# Patient Record
Sex: FEMALE | Race: WHITE
[De-identification: names, ages, dates, MRNs, and addresses within clinical notes are randomized per-mention and may not be internally consistent; named-entity substitution may affect disease eponyms.]

---

## 2019-12-23 PROBLEM — Z00.00 ENCOUNTER FOR PREVENTIVE HEALTH EXAMINATION: Status: ACTIVE | Noted: 2019-12-23

## 2024-01-11 ENCOUNTER — APPOINTMENT (OUTPATIENT)
Dept: ORTHOPEDIC SURGERY | Facility: CLINIC | Age: 69
End: 2024-01-11
Payer: COMMERCIAL

## 2024-01-11 VITALS — BODY MASS INDEX: 23.56 KG/M2 | WEIGHT: 120 LBS | HEIGHT: 60 IN

## 2024-01-11 DIAGNOSIS — Z78.9 OTHER SPECIFIED HEALTH STATUS: ICD-10-CM

## 2024-01-11 DIAGNOSIS — M23.91 UNSPECIFIED INTERNAL DERANGEMENT OF RIGHT KNEE: ICD-10-CM

## 2024-01-11 PROCEDURE — 99203 OFFICE O/P NEW LOW 30 MIN: CPT

## 2024-01-11 NOTE — IMAGING
[de-identified] : RIGHT KNEE Mild effusion, no warmth, no ecchymosis, no erythema. Medial and posterior tenderness to palpation, no palpable defects. Range of motion 0-130 with pain 5/5 quadriceps and hamstring strength Negative Lachman, positive Jie, no varus or valgus instability. Motor and sensory intact distally Negative Zan Non-antalgic gait

## 2024-01-11 NOTE — ASSESSMENT
[FreeTextEntry1] : ACUTE TRAUMATIC KNEE PAIN S/P TWISTING MECHANISM WITH EPISODES OF INSTABILITY  FAILED CONSERVATIVE TREATMENT  XR REVIEWED FROM [facility], MILD PF OA  +JENNA, EFFUSION, AND MEDIAL/POSTERIOR JLT ON EXAM TODAY  WILL OBTAIN RIGHT KNEE MRI TO EVAL FOR MMT  ADVISED [facility] FOR WB SUPPORT. RX GIVEN.  RTC AFTER MRI TO REVIEW RESULTS

## 2024-01-11 NOTE — HISTORY OF PRESENT ILLNESS
[de-identified] : 1/11/2024: 68F here for right knee pain since summer 2023. On 12/25/23, she felt severe pain in the right knee after twisting mechanism. Pt attended PT recently which helped. No previous injuries or surgeries to the right knee. Has been undercare of PCP, got XR and tried naproxen/meloxicam without improvement. Reports increasing episodes of instability.

## 2024-01-17 ENCOUNTER — APPOINTMENT (OUTPATIENT)
Dept: MRI IMAGING | Facility: CLINIC | Age: 69
End: 2024-01-17
Payer: COMMERCIAL

## 2024-01-17 PROCEDURE — 73721 MRI JNT OF LWR EXTRE W/O DYE: CPT | Mod: RT

## 2024-01-24 ENCOUNTER — NON-APPOINTMENT (OUTPATIENT)
Age: 69
End: 2024-01-24

## 2024-01-25 ENCOUNTER — APPOINTMENT (OUTPATIENT)
Dept: ORTHOPEDIC SURGERY | Facility: CLINIC | Age: 69
End: 2024-01-25
Payer: COMMERCIAL

## 2024-01-25 VITALS — WEIGHT: 120 LBS | HEIGHT: 60 IN | BODY MASS INDEX: 23.56 KG/M2

## 2024-01-25 DIAGNOSIS — S83.231D COMPLEX TEAR OF MEDIAL MENISCUS, CURRENT INJURY, RIGHT KNEE, SUBSEQUENT ENCOUNTER: ICD-10-CM

## 2024-01-25 DIAGNOSIS — S83.511D SPRAIN OF ANTERIOR CRUCIATE LIGAMENT OF RIGHT KNEE, SUBSEQUENT ENCOUNTER: ICD-10-CM

## 2024-01-25 DIAGNOSIS — M17.11 UNILATERAL PRIMARY OSTEOARTHRITIS, RIGHT KNEE: ICD-10-CM

## 2024-01-25 PROCEDURE — 99214 OFFICE O/P EST MOD 30 MIN: CPT

## 2024-03-04 ENCOUNTER — RX RENEWAL (OUTPATIENT)
Age: 69
End: 2024-03-04

## 2024-03-04 RX ORDER — MELOXICAM 15 MG/1
15 TABLET ORAL DAILY
Qty: 30 | Refills: 1 | Status: ACTIVE | COMMUNITY
Start: 2024-01-11 | End: 1900-01-01

## 2024-03-07 ENCOUNTER — APPOINTMENT (OUTPATIENT)
Dept: ORTHOPEDIC SURGERY | Facility: CLINIC | Age: 69
End: 2024-03-07
Payer: COMMERCIAL

## 2024-03-07 VITALS — WEIGHT: 120 LBS | HEIGHT: 60 IN | BODY MASS INDEX: 23.56 KG/M2

## 2024-03-07 DIAGNOSIS — M17.11 UNILATERAL PRIMARY OSTEOARTHRITIS, RIGHT KNEE: ICD-10-CM

## 2024-03-07 PROCEDURE — 99213 OFFICE O/P EST LOW 20 MIN: CPT

## 2024-03-07 NOTE — ASSESSMENT
[FreeTextEntry1] : Reports improvement with meloxicam and physical therapy.  There are still residual symptoms but she is not interested in a corticosteroid injection or viscosupplementation at this time.  She will continue her home exercise program and anti-inflammatories as needed.  Side effects reviewed again.  PT prescription renewed she will follow-up as needed after this visit

## 2024-03-07 NOTE — HISTORY OF PRESENT ILLNESS
[de-identified] : 1/11/2024: 68F here for right knee pain since summer 2023. On 12/25/23, she felt severe pain in the right knee after twisting mechanism. Pt attended PT recently which helped. No previous injuries or surgeries to the right knee. Has been undercare of PCP, got XR and tried naproxen/meloxicam without improvement. Reports increasing episodes of instability.   1/25/24: Here for right knee MRI results. Notes improvement in pain since last visit.   3/7/24: here to follow up on right knee. Attending PT with improvement. Continued swelling. Increased pain medially with prolonged rest/walking. Tried Meloxicam.

## 2024-03-07 NOTE — IMAGING
[de-identified] : No effusion, no warmth, no ecchymosis Medial joint line tenderness to palpation  Range of motion 0-130 with mild anterior crepitus 5/5 quadriceps and hamstring strength Ligamentously stable Motor and sensory intact distally Non antalgic gait Negative Zan

## 2024-04-18 ENCOUNTER — APPOINTMENT (OUTPATIENT)
Dept: ORTHOPEDIC SURGERY | Facility: CLINIC | Age: 69
End: 2024-04-18

## 2024-10-16 ENCOUNTER — NON-APPOINTMENT (OUTPATIENT)
Age: 69
End: 2024-10-16